# Patient Record
(demographics unavailable — no encounter records)

---

## 2024-10-07 NOTE — PHYSICAL EXAM

## 2024-10-07 NOTE — HISTORY OF PRESENT ILLNESS
[FreeTextEntry1] : This is a pleasant 51-year-old female referred by Dr. Addison Garcia for consultation for colon cancer screening.  She denies prior lower GI evaluation.  She denies family history of colon cancer or colon polyps.  She denies abdominal pain, nausea or vomiting.  She states that for the past 3 months she has been having throat clearing and increased coughing.  She was evaluated by ENT and underwent a laryngoscopy for which she was told to have reflux.  She was placed on a PPI therapy with improvement in symptoms.  She continues with throat clearing.  She denies dysphagia or dyne aphasia.  She denies changes in bowel habits.  She relates that once a month she would develop abdominal cramping with diarrhea.  She is not sure if she is lactose intolerant.  This also can occur prior to her menses.  She denies rectal bleeding or melena.  She denies weight loss or anemia.

## 2024-10-07 NOTE — ASSESSMENT
[FreeTextEntry1] : This is a 51-year-old female with most probable GERD/LPR presenting for colon cancer screening evaluation.  She has another appointment to see ENT in 1 to 2 weeks for follow-up visit.  She was placed on PPI therapy with good response.  I explained to her the meaning of GERD/LPR.  I recommend upper endoscopy at the same time she undergoes a screening colonoscopy.  I explained to her the risks, alternatives and benefits to both upper endoscopy and colonoscopy.  Risk including but not limited to bleeding, perforation, infection and adverse medication reaction.  Questions were answered.  She stated understanding.  It seems she has a component of diarrhea predominant IBS with symptoms of abdominal cramping and diarrhea after eating certain foods, most likely lactose intolerance, and prior to her menses.  I explained to her the meaning of IBS and lactose intolerance.  The next time she has an episode of abdominal cramping and diarrhea, she is to keep a 24-hour food diary to see if there is any correlation to certain foods paying attention specifically for dairy products.